# Patient Record
Sex: MALE | Race: WHITE
[De-identification: names, ages, dates, MRNs, and addresses within clinical notes are randomized per-mention and may not be internally consistent; named-entity substitution may affect disease eponyms.]

---

## 2017-02-14 ENCOUNTER — RX ONLY (OUTPATIENT)
Age: 37
Setting detail: RX ONLY
End: 2017-02-14

## 2017-02-14 RX ORDER — USTEKINUMAB 45 MG/.5ML
INJECTION, SOLUTION SUBCUTANEOUS
Qty: 1 | Refills: 2 | Status: ERX | COMMUNITY
Start: 2017-02-14

## 2017-04-13 ENCOUNTER — APPOINTMENT (RX ONLY)
Dept: URBAN - NONMETROPOLITAN AREA CLINIC 30 | Facility: CLINIC | Age: 37
Setting detail: DERMATOLOGY
End: 2017-04-13

## 2017-04-13 VITALS
HEIGHT: 70 IN | DIASTOLIC BLOOD PRESSURE: 88 MMHG | HEART RATE: 81 BPM | WEIGHT: 195 LBS | SYSTOLIC BLOOD PRESSURE: 124 MMHG

## 2017-04-13 DIAGNOSIS — L40.0 PSORIASIS VULGARIS: ICD-10-CM | Status: WELL CONTROLLED

## 2017-04-13 DIAGNOSIS — Z79.899 OTHER LONG TERM (CURRENT) DRUG THERAPY: ICD-10-CM

## 2017-04-13 PROCEDURE — 36415 COLL VENOUS BLD VENIPUNCTURE: CPT

## 2017-04-13 PROCEDURE — ? STELARA MONITORING

## 2017-04-13 PROCEDURE — 99213 OFFICE O/P EST LOW 20 MIN: CPT | Mod: 25

## 2017-04-13 PROCEDURE — ? VENIPUNCTURE

## 2017-04-13 PROCEDURE — ? ORDER TESTS

## 2017-04-13 PROCEDURE — 96372 THER/PROPH/DIAG INJ SC/IM: CPT

## 2017-04-13 PROCEDURE — ? INJECTION

## 2017-04-13 PROCEDURE — ? HIGH RISK MEDICATION MONITORING

## 2017-04-13 PROCEDURE — ? COUNSELING

## 2017-04-13 PROCEDURE — ? DIAGNOSIS COMMENT

## 2017-04-13 ASSESSMENT — LOCATION ZONE DERM
LOCATION ZONE: ARM
LOCATION ZONE: TRUNK

## 2017-04-13 ASSESSMENT — LOCATION SIMPLE DESCRIPTION DERM
LOCATION SIMPLE: LEFT UPPER BACK
LOCATION SIMPLE: LEFT UPPER ARM

## 2017-04-13 ASSESSMENT — BSA PSORIASIS: % BODY COVERED IN PSORIASIS: 1

## 2017-04-13 ASSESSMENT — LOCATION DETAILED DESCRIPTION DERM
LOCATION DETAILED: LEFT MID-UPPER BACK
LOCATION DETAILED: LEFT PROXIMAL POSTERIOR UPPER ARM

## 2017-04-13 NOTE — PROCEDURE: DIAGNOSIS COMMENT
Comment: I stressed again to see PCP about elevated glucose. Pt stated he understood.
Detail Level: Simple

## 2017-04-13 NOTE — PROCEDURE: STELARA MONITORING
Latest Hbv (Optional): 7/2016
Date Of Last Negative Ppd (Optional): 07/2016
Length Of Therapy: 3 months
Detail Level: Zone

## 2017-04-13 NOTE — PROCEDURE: ORDER TESTS
Bill For Surgical Tray: no
Performing Laboratory: -[id number]
Billing Type: Third-Party Bill
Expected Date Of Service: 04/13/2017

## 2017-04-13 NOTE — PROCEDURE: INJECTION
Dose Administered (Numbers Only): 45
Render J-Code Information In Note?: yes
Route: SC
Units: mg
Dose Administered (Numbers Only): 0
Expiration Date (Optional): 2/2018
Post-Care Instructions: I reviewed with the patient in detail post-care instructions. Patient understands to keep the injection sites clean and call the clinic if there is any redness, swelling or pain.
Detail Level: Simple
Medication (1) And Associated J-Code Units: Ustekinumab (Stelara), 1mg
Administered By (Optional): CHASTITY Negrete LVN
Consent: The risks of the medication was reviewed with the patient.
Lot # (Optional): [id number]

## 2017-07-13 ENCOUNTER — APPOINTMENT (RX ONLY)
Dept: URBAN - NONMETROPOLITAN AREA CLINIC 30 | Facility: CLINIC | Age: 37
Setting detail: DERMATOLOGY
End: 2017-07-13

## 2017-07-13 VITALS
HEIGHT: 70 IN | DIASTOLIC BLOOD PRESSURE: 76 MMHG | HEART RATE: 75 BPM | WEIGHT: 195 LBS | RESPIRATION RATE: 20 BRPM | SYSTOLIC BLOOD PRESSURE: 127 MMHG

## 2017-07-13 DIAGNOSIS — L40.0 PSORIASIS VULGARIS: ICD-10-CM | Status: WELL CONTROLLED

## 2017-07-13 DIAGNOSIS — L81.4 OTHER MELANIN HYPERPIGMENTATION: ICD-10-CM

## 2017-07-13 DIAGNOSIS — D22 MELANOCYTIC NEVI: ICD-10-CM

## 2017-07-13 DIAGNOSIS — Z79.899 OTHER LONG TERM (CURRENT) DRUG THERAPY: ICD-10-CM

## 2017-07-13 DIAGNOSIS — Z71.89 OTHER SPECIFIED COUNSELING: ICD-10-CM

## 2017-07-13 PROBLEM — D22.71 MELANOCYTIC NEVI OF RIGHT LOWER LIMB, INCLUDING HIP: Status: ACTIVE | Noted: 2017-07-13

## 2017-07-13 PROBLEM — D22.9 MELANOCYTIC NEVI, UNSPECIFIED: Status: ACTIVE | Noted: 2017-07-13

## 2017-07-13 PROBLEM — D22.72 MELANOCYTIC NEVI OF LEFT LOWER LIMB, INCLUDING HIP: Status: ACTIVE | Noted: 2017-07-13

## 2017-07-13 PROCEDURE — 36415 COLL VENOUS BLD VENIPUNCTURE: CPT

## 2017-07-13 PROCEDURE — ? ORDER TESTS

## 2017-07-13 PROCEDURE — ? COUNSELING

## 2017-07-13 PROCEDURE — 96372 THER/PROPH/DIAG INJ SC/IM: CPT

## 2017-07-13 PROCEDURE — ? SUNSCREEN RECOMMENDATIONS

## 2017-07-13 PROCEDURE — ? STELARA MONITORING

## 2017-07-13 PROCEDURE — ? DIAGNOSIS COMMENT

## 2017-07-13 PROCEDURE — ? VENIPUNCTURE

## 2017-07-13 PROCEDURE — ? HIGH RISK MEDICATION MONITORING

## 2017-07-13 PROCEDURE — ? INJECTION

## 2017-07-13 PROCEDURE — 99214 OFFICE O/P EST MOD 30 MIN: CPT | Mod: 25

## 2017-07-13 ASSESSMENT — LOCATION ZONE DERM
LOCATION ZONE: FACE
LOCATION ZONE: ARM
LOCATION ZONE: TRUNK
LOCATION ZONE: LEG

## 2017-07-13 ASSESSMENT — LOCATION SIMPLE DESCRIPTION DERM
LOCATION SIMPLE: LEFT THIGH
LOCATION SIMPLE: RIGHT UPPER ARM
LOCATION SIMPLE: LEFT CHEEK
LOCATION SIMPLE: CHIN
LOCATION SIMPLE: RIGHT THIGH
LOCATION SIMPLE: LEFT UPPER BACK

## 2017-07-13 ASSESSMENT — LOCATION DETAILED DESCRIPTION DERM
LOCATION DETAILED: LEFT ANTERIOR DISTAL THIGH
LOCATION DETAILED: LEFT CHIN
LOCATION DETAILED: RIGHT ANTERIOR DISTAL THIGH
LOCATION DETAILED: RIGHT PROXIMAL POSTERIOR UPPER ARM
LOCATION DETAILED: LEFT MID-UPPER BACK
LOCATION DETAILED: LEFT INFERIOR MEDIAL BUCCAL CHEEK

## 2017-07-13 ASSESSMENT — PGA PSORIASIS: PGA PSORIASIS: MINIMAL (MINIMAL PLAQUE ELEVATION, FAINT ERYTHEMA, OCCASIONAL FINE SCALE)

## 2017-07-13 ASSESSMENT — BSA PSORIASIS: % BODY COVERED IN PSORIASIS: 1

## 2017-07-13 NOTE — PROCEDURE: DIAGNOSIS COMMENT
Comment: TB spot sent to [place]
Detail Level: Simple
Comment: Pt has been well controlled on stelara for a year.  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  Pt stated he understood.\\n\\[place] pt finally followed up with PCP and is now treating his diabetes and hypercholesterolemia
Comment: Will recheck these nevi in 6mos.  I did review an older photo we had and no change noted.

## 2017-07-13 NOTE — PROCEDURE: STELARA MONITORING
Patient Reported Weight(Optional But Include Units): 195.0
Length Of Therapy: 5 months
Date Of Last Negative Ppd (Optional): 07/2017
Latest Hbv (Optional): 7/2017
Detail Level: Zone

## 2017-07-13 NOTE — PROCEDURE: VENIPUNCTURE
Detail Level: None
Number Of Tubes Drawn: 4
Venipuncture Paragraph: An alcohol pad was applied to the venipuncture site. Venipuncture was performed using a 22g needle. Pressure and a bandaid was applied to the site. No complications were noted.  TB SPOT DRAWN TODAY

## 2017-07-13 NOTE — HPI: MEDICATION (STELARA)
How Severe Is It?: moderate
Is This A New Presentation, Or A Follow-Up?: Follow Up Stelara
How Many Months Of Stelara Have You Completed?: 12

## 2017-07-13 NOTE — PROCEDURE: INJECTION
Consent: The risks of the medication was reviewed with the patient.
Dose Administered (Numbers Only): 0
Render J-Code Information In Note?: yes
Dose Administered (Numbers Only): 45
Lot # (Optional): [id number]
Medication (1) And Associated J-Code Units: Ustekinumab (Stelara), 1mg
Administered By (Optional): MUNA Hadley LVN
Route: SC
Post-Care Instructions: I reviewed with the patient in detail post-care instructions. Patient understands to keep the injection sites clean and call the clinic if there is any redness, swelling or pain.
Expiration Date (Optional): 9/18
Units: mg
Detail Level: Simple
Procedure Information: Please note that the numeric value listed in the Medication (1) and associated J-code units and Medication (2) and associated J-code units variables are j-code amounts and do not represent either the concentration or the total amount of the medications injected.  I strongly recommend selecting no to the Render J-code information in note question. This will allow your note to be more clear. If you are billing j-codes with your injection codes you need to document the total amount of the medication injected. This amount should match the j-code units. For example, if you are injecting Triamcinolone 40mg as an intramuscular injection you would select 40 for the dose field and mg for the units. This would allow you to document  with 4 units (40mg = 10mg x 4). The total volume is not used to calculate j-codes only the amount of the medication administered.

## 2018-03-14 ENCOUNTER — APPOINTMENT (RX ONLY)
Dept: URBAN - NONMETROPOLITAN AREA CLINIC 30 | Facility: CLINIC | Age: 38
Setting detail: DERMATOLOGY
End: 2018-03-14

## 2018-03-14 VITALS
HEIGHT: 70 IN | RESPIRATION RATE: 20 BRPM | DIASTOLIC BLOOD PRESSURE: 78 MMHG | HEART RATE: 93 BPM | SYSTOLIC BLOOD PRESSURE: 113 MMHG | WEIGHT: 195 LBS

## 2018-03-14 DIAGNOSIS — L40.0 PSORIASIS VULGARIS: ICD-10-CM | Status: WELL CONTROLLED

## 2018-03-14 DIAGNOSIS — Z79.899 OTHER LONG TERM (CURRENT) DRUG THERAPY: ICD-10-CM

## 2018-03-14 PROCEDURE — 96372 THER/PROPH/DIAG INJ SC/IM: CPT

## 2018-03-14 PROCEDURE — ? INJECTION

## 2018-03-14 PROCEDURE — ? DIAGNOSIS COMMENT

## 2018-03-14 PROCEDURE — 36415 COLL VENOUS BLD VENIPUNCTURE: CPT

## 2018-03-14 PROCEDURE — ? STELARA MONITORING

## 2018-03-14 PROCEDURE — ? VENIPUNCTURE

## 2018-03-14 PROCEDURE — ? COUNSELING

## 2018-03-14 PROCEDURE — ? ORDER TESTS

## 2018-03-14 PROCEDURE — ? HIGH RISK MEDICATION MONITORING

## 2018-03-14 PROCEDURE — 99213 OFFICE O/P EST LOW 20 MIN: CPT | Mod: 25

## 2018-03-14 ASSESSMENT — LOCATION ZONE DERM
LOCATION ZONE: TRUNK
LOCATION ZONE: ARM

## 2018-03-14 ASSESSMENT — LOCATION DETAILED DESCRIPTION DERM
LOCATION DETAILED: LEFT MID-UPPER BACK
LOCATION DETAILED: RIGHT PROXIMAL POSTERIOR UPPER ARM

## 2018-03-14 ASSESSMENT — BSA PSORIASIS: % BODY COVERED IN PSORIASIS: 1

## 2018-03-14 ASSESSMENT — LOCATION SIMPLE DESCRIPTION DERM
LOCATION SIMPLE: LEFT UPPER BACK
LOCATION SIMPLE: RIGHT UPPER ARM

## 2018-03-14 NOTE — PROCEDURE: DIAGNOSIS COMMENT
Comment: Pt has been well controlled on stelara for over a year.  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  Pt stated he understood.\\n\\nPt is now treating his diabetes.
Detail Level: Simple

## 2018-03-14 NOTE — PROCEDURE: ORDER TESTS
Billing Type: Third-Party Bill
Performing Laboratory: -[id number]
Bill For Surgical Tray: no
Expected Date Of Service: 04/13/2017

## 2018-03-14 NOTE — PROCEDURE: STELARA MONITORING
Detail Level: Zone
Latest Hbv (Optional): 7/2017
Length Of Therapy: 1.5 years
Patient Reported Weight(Optional But Include Units): 195.0
Date Of Last Negative Ppd (Optional): 07/2017

## 2018-03-14 NOTE — PROCEDURE: INJECTION
Procedure Information: Please note that the numeric value listed in the Medication (1) and associated J-code units and Medication (2) and associated J-code units variables are j-code amounts and do not represent either the concentration or the total amount of the medications injected.  I strongly recommend selecting no to the Render J-code information in note question. This will allow your note to be more clear. If you are billing j-codes with your injection codes you need to document the total amount of the medication injected. This amount should match the j-code units. For example, if you are injecting Triamcinolone 40mg as an intramuscular injection you would select 40 for the dose field and mg for the units. This would allow you to document  with 4 units (40mg = 10mg x 4). The total volume is not used to calculate j-codes only the amount of the medication administered.
Units: mg
Render J-Code Information In Note?: yes
Dose Administered (Numbers Only): 0
Medication (1) And Associated J-Code Units: Ustekinumab (Stelara), 1mg
Lot # (Optional): [id number]
Detail Level: Simple
Dose Administered (Numbers Only): 45
Expiration Date (Optional): 6/2019
Administered By (Optional): MUNA Hadley LVN
Post-Care Instructions: I reviewed with the patient in detail post-care instructions. Patient understands to keep the injection sites clean and call the clinic if there is any redness, swelling or pain.
Route: SC
Consent: The risks of the medication was reviewed with the patient.

## 2018-06-11 ENCOUNTER — APPOINTMENT (RX ONLY)
Dept: URBAN - NONMETROPOLITAN AREA CLINIC 30 | Facility: CLINIC | Age: 38
Setting detail: DERMATOLOGY
End: 2018-06-11

## 2018-06-11 VITALS
HEIGHT: 70 IN | HEART RATE: 73 BPM | DIASTOLIC BLOOD PRESSURE: 86 MMHG | SYSTOLIC BLOOD PRESSURE: 129 MMHG | RESPIRATION RATE: 20 BRPM | WEIGHT: 190 LBS

## 2018-06-11 DIAGNOSIS — L40.0 PSORIASIS VULGARIS: ICD-10-CM | Status: WELL CONTROLLED

## 2018-06-11 DIAGNOSIS — Z71.89 OTHER SPECIFIED COUNSELING: ICD-10-CM

## 2018-06-11 DIAGNOSIS — D22 MELANOCYTIC NEVI: ICD-10-CM

## 2018-06-11 DIAGNOSIS — Z79.899 OTHER LONG TERM (CURRENT) DRUG THERAPY: ICD-10-CM

## 2018-06-11 PROBLEM — D22.5 MELANOCYTIC NEVI OF TRUNK: Status: ACTIVE | Noted: 2018-06-11

## 2018-06-11 PROCEDURE — ? ORDER TESTS

## 2018-06-11 PROCEDURE — ? DIAGNOSIS COMMENT

## 2018-06-11 PROCEDURE — ? COUNSELING

## 2018-06-11 PROCEDURE — 99214 OFFICE O/P EST MOD 30 MIN: CPT | Mod: 25

## 2018-06-11 PROCEDURE — ? VENIPUNCTURE

## 2018-06-11 PROCEDURE — ? STELARA MONITORING

## 2018-06-11 PROCEDURE — 36415 COLL VENOUS BLD VENIPUNCTURE: CPT

## 2018-06-11 PROCEDURE — ? ADDITIONAL NOTES

## 2018-06-11 PROCEDURE — ? SUNSCREEN RECOMMENDATIONS

## 2018-06-11 PROCEDURE — ? HIGH RISK MEDICATION MONITORING

## 2018-06-11 ASSESSMENT — BSA PSORIASIS: % BODY COVERED IN PSORIASIS: 0

## 2018-06-11 ASSESSMENT — LOCATION DETAILED DESCRIPTION DERM: LOCATION DETAILED: LEFT MID-UPPER BACK

## 2018-06-11 ASSESSMENT — LOCATION ZONE DERM: LOCATION ZONE: TRUNK

## 2018-06-11 ASSESSMENT — LOCATION SIMPLE DESCRIPTION DERM: LOCATION SIMPLE: LEFT UPPER BACK

## 2018-06-11 NOTE — PROCEDURE: VENIPUNCTURE
Number Of Tubes Drawn: 4
Detail Level: None
Venipuncture Paragraph: An alcohol pad was applied to the venipuncture site. Venipuncture was performed using a butterfly needle. Pressure and a bandaid was applied to the site. No complications were noted.

## 2018-06-11 NOTE — PROCEDURE: STELARA MONITORING
Latest Hbv (Optional): 7/2017
Patient Reported Weight(Optional But Include Units): 195.0
Date Of Last Negative Ppd (Optional): 07/2017 - drawn today
Detail Level: Zone
Length Of Therapy: 1.5 years

## 2018-06-11 NOTE — PROCEDURE: ORDER TESTS
Bill For Surgical Tray: no
Billing Type: Third-Party Bill
Expected Date Of Service: 04/13/2017
Performing Laboratory: -[id number]

## 2018-08-14 ENCOUNTER — RX ONLY (OUTPATIENT)
Age: 38
Setting detail: RX ONLY
End: 2018-08-14

## 2018-08-14 RX ORDER — USTEKINUMAB 45 MG/.5ML
INJECTION, SOLUTION SUBCUTANEOUS
Qty: 1 | Refills: 2 | Status: ERX

## 2018-12-11 ENCOUNTER — APPOINTMENT (RX ONLY)
Dept: URBAN - NONMETROPOLITAN AREA CLINIC 30 | Facility: CLINIC | Age: 38
Setting detail: DERMATOLOGY
End: 2018-12-11

## 2018-12-11 DIAGNOSIS — Z79.899 OTHER LONG TERM (CURRENT) DRUG THERAPY: ICD-10-CM

## 2018-12-11 DIAGNOSIS — L40.0 PSORIASIS VULGARIS: ICD-10-CM

## 2018-12-11 PROBLEM — E78.5 HYPERLIPIDEMIA, UNSPECIFIED: Status: ACTIVE | Noted: 2018-12-11

## 2018-12-11 PROBLEM — I10 ESSENTIAL (PRIMARY) HYPERTENSION: Status: ACTIVE | Noted: 2018-12-11

## 2018-12-11 PROCEDURE — ? DIAGNOSIS COMMENT

## 2018-12-11 PROCEDURE — ? ORDER TESTS

## 2018-12-11 PROCEDURE — ? VENIPUNCTURE

## 2018-12-11 PROCEDURE — 36415 COLL VENOUS BLD VENIPUNCTURE: CPT

## 2018-12-11 PROCEDURE — ? COUNSELING

## 2018-12-11 PROCEDURE — ? HIGH RISK MEDICATION MONITORING

## 2018-12-11 PROCEDURE — 99213 OFFICE O/P EST LOW 20 MIN: CPT | Mod: 25

## 2018-12-11 PROCEDURE — ? STELARA MONITORING

## 2018-12-11 ASSESSMENT — BSA PSORIASIS: % BODY COVERED IN PSORIASIS: 0

## 2018-12-11 ASSESSMENT — LOCATION SIMPLE DESCRIPTION DERM
LOCATION SIMPLE: LEFT ELBOW
LOCATION SIMPLE: SUPERIOR FOREHEAD
LOCATION SIMPLE: RIGHT FOREHEAD

## 2018-12-11 ASSESSMENT — LOCATION ZONE DERM
LOCATION ZONE: ARM
LOCATION ZONE: FACE
LOCATION ZONE: FACE

## 2018-12-11 ASSESSMENT — LOCATION DETAILED DESCRIPTION DERM
LOCATION DETAILED: RIGHT SUPERIOR MEDIAL FOREHEAD
LOCATION DETAILED: SUPERIOR MID FOREHEAD
LOCATION DETAILED: LEFT ANTECUBITAL SKIN

## 2018-12-11 NOTE — PROCEDURE: ORDER TESTS
Bill For Surgical Tray: no
Performing Laboratory: -[id number]
Billing Type: Third-Party Bill
Expected Date Of Service: 12/11/2018

## 2018-12-11 NOTE — PROCEDURE: STELARA MONITORING
Latest Hbv (Optional): 7/2017
Detail Level: Zone
Date Of Last Negative Ppd (Optional): 07/2017 - drawn today
Patient Reported Weight(Optional But Include Units): 195.0
Length Of Therapy: 1.5 years

## 2019-10-31 NOTE — PROCEDURE: ORDER TESTS
No
Bill For Surgical Tray: no
Billing Type: Third-Party Bill
Expected Date Of Service: 04/13/2017
Performing Laboratory: -[id number]